# Patient Record
Sex: FEMALE | Race: WHITE | NOT HISPANIC OR LATINO | URBAN - METROPOLITAN AREA
[De-identification: names, ages, dates, MRNs, and addresses within clinical notes are randomized per-mention and may not be internally consistent; named-entity substitution may affect disease eponyms.]

---

## 2018-09-20 PROBLEM — Z00.00 ENCOUNTER FOR PREVENTIVE HEALTH EXAMINATION: Status: ACTIVE | Noted: 2018-09-20

## 2018-09-26 ENCOUNTER — OUTPATIENT (OUTPATIENT)
Dept: OUTPATIENT SERVICES | Facility: HOSPITAL | Age: 43
LOS: 1 days | End: 2018-09-26
Payer: COMMERCIAL

## 2018-09-26 ENCOUNTER — RESULT REVIEW (OUTPATIENT)
Age: 43
End: 2018-09-26

## 2018-09-26 ENCOUNTER — APPOINTMENT (OUTPATIENT)
Dept: ULTRASOUND IMAGING | Facility: HOSPITAL | Age: 43
End: 2018-09-26
Payer: COMMERCIAL

## 2018-09-26 PROCEDURE — 19083 BX BREAST 1ST LESION US IMAG: CPT | Mod: LT

## 2018-09-26 PROCEDURE — A4648: CPT

## 2018-09-26 PROCEDURE — 19083 BX BREAST 1ST LESION US IMAG: CPT

## 2018-09-26 PROCEDURE — 88305 TISSUE EXAM BY PATHOLOGIST: CPT

## 2018-09-26 PROCEDURE — 77065 DX MAMMO INCL CAD UNI: CPT | Mod: 26,LT

## 2018-09-26 PROCEDURE — 77065 DX MAMMO INCL CAD UNI: CPT

## 2018-09-27 LAB — SURGICAL PATHOLOGY STUDY: SIGNIFICANT CHANGE UP

## 2024-04-01 ENCOUNTER — NON-APPOINTMENT (OUTPATIENT)
Age: 49
End: 2024-04-01

## 2024-04-01 DIAGNOSIS — Z30.41 ENCOUNTER FOR SURVEILLANCE OF CONTRACEPTIVE PILLS: ICD-10-CM

## 2024-04-01 DIAGNOSIS — Z85.3 PERSONAL HISTORY OF MALIGNANT NEOPLASM OF BREAST: ICD-10-CM

## 2024-04-01 DIAGNOSIS — Z85.828 PERSONAL HISTORY OF OTHER MALIGNANT NEOPLASM OF SKIN: ICD-10-CM

## 2024-04-01 DIAGNOSIS — Z86.39 PERSONAL HISTORY OF OTHER ENDOCRINE, NUTRITIONAL AND METABOLIC DISEASE: ICD-10-CM

## 2024-04-01 DIAGNOSIS — Z78.9 OTHER SPECIFIED HEALTH STATUS: ICD-10-CM

## 2024-04-01 DIAGNOSIS — Z87.898 PERSONAL HISTORY OF OTHER SPECIFIED CONDITIONS: ICD-10-CM

## 2024-04-01 DIAGNOSIS — Z87.19 PERSONAL HISTORY OF OTHER DISEASES OF THE DIGESTIVE SYSTEM: ICD-10-CM

## 2024-04-01 DIAGNOSIS — Z80.0 FAMILY HISTORY OF MALIGNANT NEOPLASM OF DIGESTIVE ORGANS: ICD-10-CM

## 2024-04-01 DIAGNOSIS — Z80.7 FAMILY HISTORY OF OTHER MALIGNANT NEOPLASMS OF LYMPHOID, HEMATOPOIETIC AND RELATED TISSUES: ICD-10-CM

## 2024-04-01 DIAGNOSIS — C50.919 MALIGNANT NEOPLASM OF UNSPECIFIED SITE OF UNSPECIFIED FEMALE BREAST: ICD-10-CM

## 2024-04-01 DIAGNOSIS — R79.89 OTHER SPECIFIED ABNORMAL FINDINGS OF BLOOD CHEMISTRY: ICD-10-CM

## 2024-04-01 RX ORDER — OMEGA-3/DHA/EPA/FISH OIL 300-1000MG
CAPSULE ORAL
Refills: 0 | Status: ACTIVE | COMMUNITY

## 2024-04-01 RX ORDER — CHOLECALCIFEROL (VITAMIN D3) 25 MCG
TABLET ORAL
Refills: 0 | Status: ACTIVE | COMMUNITY

## 2024-04-01 RX ORDER — CALCIUM CARBONATE/VITAMIN D3 600 MG-10
TABLET ORAL
Refills: 0 | Status: ACTIVE | COMMUNITY

## 2024-04-01 RX ORDER — UBIDECARENONE 200 MG
CAPSULE ORAL
Refills: 0 | Status: ACTIVE | COMMUNITY

## 2024-04-01 RX ORDER — ELECTROLYTES/DEXTROSE
SOLUTION, ORAL ORAL
Refills: 0 | Status: ACTIVE | COMMUNITY

## 2024-04-01 RX ORDER — ASCORBIC ACID 500 MG
TABLET ORAL
Refills: 0 | Status: ACTIVE | COMMUNITY

## 2024-08-26 NOTE — HISTORY OF PRESENT ILLNESS
[de-identified] : At age 39, patient had palpable abnormality in her Lt breast.  Core biopsy consistent with fibroadenoma.  On 8/15/18, bilat breast MRI revealed 2 enhancing masses Lt suggestive of fibroadenoma and then additional 1.4 cm subareolar mass and  Rt breast 9 mm enhancing mass retroareolar 12 o'clock with  benign morphology consistent with fibroadenoma.  Targetted ultrasound of Lt revealed solid mass with increased vascularity subareolar Lt and 6 mm well-circumscribed, hypoechoic mass Rt 7 o'clock consistent with fibroadenoma.  On 18, ultrasound-guided FNA Lt consistent with papillary lesion and ultrasound-guided Lt 2 o'clock proliferative change without atypia.  Path review [facility] FNA Lt subareolar possible papillary neoplasm versus fibroadenoma, 2 o'clock fibroadenoma Lt and core Lt 2 o'clock fibroadenoma.  On 10/17/18, MRI-guided biopsy Lt LCIS.  10/25/18, WLE Lt 3 o'clock LCIS classic type with intermediate grade nuclei, intraductal papilloma 2 mm, UOQ microinvasive lobular carcinoma and LCIS, 2 foci microinvasion <1 mm identified with intermediate grade nuclei, no LVI, LCIS classic type with intermediate grade nuclei, sclerotic intraductal papilloma with ductal hyperplasia without atypia, additional areas showed LCIS and ADH, and margins were neg.  ER and NM and HER-2 could not be done since there were no tissue available.  She presents today for further evaluation.   ALLERGIES: Latex.  PMH: Menarche 14, has IUD, G2, P2, GYN summer , Mirena IUD.   29 and 31, breast-fed 6 M each, clomid 1 cycle, OCP in 20s, Mirena placed  and again , Hx hypothyroid, hernia repair, abdominal inguinal , fractured Lt wrist age 13.  Colonoscopy 5-8 Y ago, BMD 1 Y ago.  BRCA neg.  Hx squamous cell carcinoma back and labia, low testosterone.  FAMILY HX: Ashkenazic, mother 74 and had 2 brothers, father 78 and had 1 sister with colon cancer in his 50s, a paternal female first cousin had non-Hodgkin's lymphoma, patient comes from a family of a few women.Patient has 1 sister, 2 brothers, and 2 daughters.   SOCIAL HX: , , lives with her , heterosexual and active, exercises regularly, no tobacco, nonsmoker, occasional EtOH.

## 2024-08-26 NOTE — ASSESSMENT
[FreeTextEntry1] : [facility] S/P WLE for classic microinvasive lobular carcinoma - 2 foci <1 mm each and classic LCIS as well as intraductal papilloma.  Pathology and prognosis were reviewed in detail.  No tissue  available to perform ER/KS and HER-2, but suspect would be hormone receptor pos.  Rationale for RT considering her young age and microinvasion was discussed in detail as well as antiestrogen treatment with Tamoxifen.  Risk of disease recurrence, local versus systemic as well as contralateral primary were discussed.  Patient is at low risk for systemic recurrence in light of microinvasion.  Patient wishes to pursue bilat mastectomy with reconstruction since she does not want to pursue antiestrogen or RT.

## 2024-09-05 ENCOUNTER — APPOINTMENT (OUTPATIENT)
Dept: HEMATOLOGY ONCOLOGY | Facility: CLINIC | Age: 49
End: 2024-09-05
Payer: COMMERCIAL

## 2024-09-05 ENCOUNTER — NON-APPOINTMENT (OUTPATIENT)
Age: 49
End: 2024-09-05

## 2024-09-05 VITALS
HEART RATE: 88 BPM | DIASTOLIC BLOOD PRESSURE: 75 MMHG | BODY MASS INDEX: 22.66 KG/M2 | WEIGHT: 120 LBS | SYSTOLIC BLOOD PRESSURE: 109 MMHG | OXYGEN SATURATION: 97 % | TEMPERATURE: 98.7 F | HEIGHT: 61 IN | RESPIRATION RATE: 18 BRPM

## 2024-09-05 DIAGNOSIS — Z90.13 ACQUIRED ABSENCE OF BILATERAL BREASTS AND NIPPLES: ICD-10-CM

## 2024-09-05 DIAGNOSIS — C50.912 MALIGNANT NEOPLASM OF UNSPECIFIED SITE OF LEFT FEMALE BREAST: ICD-10-CM

## 2024-09-05 PROCEDURE — G2211 COMPLEX E/M VISIT ADD ON: CPT | Mod: NC

## 2024-09-05 PROCEDURE — 99204 OFFICE O/P NEW MOD 45 MIN: CPT

## 2024-09-05 RX ORDER — ZINC OXIDE 13 %
CREAM (GRAM) TOPICAL
Refills: 0 | Status: ACTIVE | COMMUNITY
Start: 2024-09-05

## 2024-09-06 PROBLEM — Z90.13 STATUS POST BILATERAL MASTECTOMY: Status: ACTIVE | Noted: 2024-09-05

## 2024-09-06 PROBLEM — C50.912 MALIGNANT NEOPLASM OF LEFT FEMALE BREAST, UNSPECIFIED ESTROGEN RECEPTOR STATUS, UNSPECIFIED SITE OF BREAST: Status: ACTIVE | Noted: 2024-09-06

## 2024-09-06 NOTE — HISTORY OF PRESENT ILLNESS
[de-identified] : At age 39, patient had palpable abnormality in her Lt breast.  Core biopsy consistent with fibroadenoma.  On 8/15/18, bilat breast MRI revealed 2 enhancing masses Lt suggestive of fibroadenoma and then additional 1.4 cm subareolar mass and  Rt breast 9 mm enhancing mass retroareolar 12 o'clock with  benign morphology consistent with fibroadenoma.  Targetted ultrasound of Lt revealed solid mass with increased vascularity subareolar Lt and 6 mm well-circumscribed, hypoechoic mass Rt 7 o'clock consistent with fibroadenoma.  On 18, ultrasound-guided FNA Lt consistent with papillary lesion and ultrasound-guided Lt 2 o'clock proliferative change without atypia.  Path review [facility] FNA Lt subareolar possible papillary neoplasm versus fibroadenoma, 2 o'clock fibroadenoma Lt and core Lt 2 o'clock fibroadenoma.  On 10/17/18, MRI-guided biopsy Lt LCIS.  10/25/18, WLE Lt 3 o'clock LCIS classic type with intermediate grade nuclei, intraductal papilloma 2 mm, UOQ microinvasive lobular carcinoma and LCIS, 2 foci microinvasion <1 mm identified with intermediate grade nuclei, no LVI, LCIS classic type with intermediate grade nuclei, sclerotic intraductal papilloma with ductal hyperplasia without atypia, additional areas showed LCIS and ADH, and margins were neg.  ER and MO and HER-2 could not be done since no tissue available. 2018 Bilat mastectomy sln - Rt benign, Lt LCIS and 1 Lt SLN neg. 10/3/23 Breast MRI - OBIE. HSS BMD with osteopenia wrist - D with K , wt bearing exercise. Trying to be vegan. C/o sensitivity L breast   PMH: Menarche 14, has IUD, G2, P2, GYN    - no menses p 2-3 months   29 and 31, breast-fed 6 M each, clomid 1 cycle, OCP in 20s, Mirena placed  and again  removed , Hx hypothyroid, hernia repair, abdominal inguinal , fractured Lt wrist age 13.  Colonoscopy    Hx squamous cell carcinoma back and labia, low testosterone.  FAMILY HX:Invitae  vus MARIAM- reclassified as likely benign. Ashkenazic, mother 74 and had 2 brothers, father 78 and had 1 sister with colon cancer in his 50s, a paternal female first cousin had non-Hodgkin's lymphoma, patient comes from a family of a few women.Patient has 1 sister, 2 brothers, and 2 daughters.   SOCIAL HX: , , lives with her , heterosexual and active, exercises regularly, no tobacco, nonsmoker, occasional EtOH.

## 2024-09-06 NOTE — HISTORY OF PRESENT ILLNESS
[de-identified] : At age 39, patient had palpable abnormality in her Lt breast.  Core biopsy consistent with fibroadenoma.  On 8/15/18, bilat breast MRI revealed 2 enhancing masses Lt suggestive of fibroadenoma and then additional 1.4 cm subareolar mass and  Rt breast 9 mm enhancing mass retroareolar 12 o'clock with  benign morphology consistent with fibroadenoma.  Targetted ultrasound of Lt revealed solid mass with increased vascularity subareolar Lt and 6 mm well-circumscribed, hypoechoic mass Rt 7 o'clock consistent with fibroadenoma.  On 18, ultrasound-guided FNA Lt consistent with papillary lesion and ultrasound-guided Lt 2 o'clock proliferative change without atypia.  Path review [facility] FNA Lt subareolar possible papillary neoplasm versus fibroadenoma, 2 o'clock fibroadenoma Lt and core Lt 2 o'clock fibroadenoma.  On 10/17/18, MRI-guided biopsy Lt LCIS.  10/25/18, WLE Lt 3 o'clock LCIS classic type with intermediate grade nuclei, intraductal papilloma 2 mm, UOQ microinvasive lobular carcinoma and LCIS, 2 foci microinvasion <1 mm identified with intermediate grade nuclei, no LVI, LCIS classic type with intermediate grade nuclei, sclerotic intraductal papilloma with ductal hyperplasia without atypia, additional areas showed LCIS and ADH, and margins were neg.  ER and OR and HER-2 could not be done since no tissue available. 2018 Bilat mastectomy sln - Rt benign, Lt LCIS and 1 Lt SLN neg. 10/3/23 Breast MRI - OBIE. HSS BMD with osteopenia wrist - D with K , wt bearing exercise. Trying to be vegan. C/o sensitivity L breast   PMH: Menarche 14, has IUD, G2, P2, GYN    - no menses p 2-3 months   29 and 31, breast-fed 6 M each, clomid 1 cycle, OCP in 20s, Mirena placed  and again  removed , Hx hypothyroid, hernia repair, abdominal inguinal , fractured Lt wrist age 13.  Colonoscopy    Hx squamous cell carcinoma back and labia, low testosterone.  FAMILY HX:Invitae  vus MARIAM- reclassified as likely benign. Ashkenazic, mother 74 and had 2 brothers, father 78 and had 1 sister with colon cancer in his 50s, a paternal female first cousin had non-Hodgkin's lymphoma, patient comes from a family of a few women.Patient has 1 sister, 2 brothers, and 2 daughters.   SOCIAL HX: , , lives with her , heterosexual and active, exercises regularly, no tobacco, nonsmoker, occasional EtOH.

## 2024-09-06 NOTE — PHYSICAL EXAM
[Fully active, able to carry on all pre-disease performance without restriction] : Status 0 - Fully active, able to carry on all pre-disease performance without restriction [Normal] : affect appropriate [de-identified] : S/P bilat mast with implants LNSP ? periareolar density

## 2024-09-06 NOTE — ASSESSMENT
[FreeTextEntry1] : Gene neg Ashkenazic, perimenopause,  S/P Lt WLE for classic microinvasive lobular carcinoma - 2 foci <1 mm each and classic LCIS as well as intraductal papilloma. [id number] Pathology and prognosis were reviewed in detail.  S/p Bilat mastectomy with implants -., L NSP. New c/o sensitivity near L nipple ? density - for diagnostic US. Reviewed diet and exercise. F/U 6 months

## 2024-09-06 NOTE — PHYSICAL EXAM
[Fully active, able to carry on all pre-disease performance without restriction] : Status 0 - Fully active, able to carry on all pre-disease performance without restriction [Normal] : affect appropriate [de-identified] : S/P bilat mast with implants LNSP ? periareolar density

## 2024-09-06 NOTE — PHYSICAL EXAM
[Fully active, able to carry on all pre-disease performance without restriction] : Status 0 - Fully active, able to carry on all pre-disease performance without restriction [Normal] : affect appropriate [de-identified] : S/P bilat mast with implants LNSP ? periareolar density

## 2024-09-06 NOTE — HISTORY OF PRESENT ILLNESS
[de-identified] : At age 39, patient had palpable abnormality in her Lt breast.  Core biopsy consistent with fibroadenoma.  On 8/15/18, bilat breast MRI revealed 2 enhancing masses Lt suggestive of fibroadenoma and then additional 1.4 cm subareolar mass and  Rt breast 9 mm enhancing mass retroareolar 12 o'clock with  benign morphology consistent with fibroadenoma.  Targetted ultrasound of Lt revealed solid mass with increased vascularity subareolar Lt and 6 mm well-circumscribed, hypoechoic mass Rt 7 o'clock consistent with fibroadenoma.  On 18, ultrasound-guided FNA Lt consistent with papillary lesion and ultrasound-guided Lt 2 o'clock proliferative change without atypia.  Path review [facility] FNA Lt subareolar possible papillary neoplasm versus fibroadenoma, 2 o'clock fibroadenoma Lt and core Lt 2 o'clock fibroadenoma.  On 10/17/18, MRI-guided biopsy Lt LCIS.  10/25/18, WLE Lt 3 o'clock LCIS classic type with intermediate grade nuclei, intraductal papilloma 2 mm, UOQ microinvasive lobular carcinoma and LCIS, 2 foci microinvasion <1 mm identified with intermediate grade nuclei, no LVI, LCIS classic type with intermediate grade nuclei, sclerotic intraductal papilloma with ductal hyperplasia without atypia, additional areas showed LCIS and ADH, and margins were neg.  ER and OR and HER-2 could not be done since no tissue available. 2018 Bilat mastectomy sln - Rt benign, Lt LCIS and 1 Lt SLN neg. 10/3/23 Breast MRI - OBIE. HSS BMD with osteopenia wrist - D with K , wt bearing exercise. Trying to be vegan. C/o sensitivity L breast   PMH: Menarche 14, has IUD, G2, P2, GYN    - no menses p 2-3 months   29 and 31, breast-fed 6 M each, clomid 1 cycle, OCP in 20s, Mirena placed  and again  removed , Hx hypothyroid, hernia repair, abdominal inguinal , fractured Lt wrist age 13.  Colonoscopy    Hx squamous cell carcinoma back and labia, low testosterone.  FAMILY HX:Invitae  vus MARIAM- reclassified as likely benign. Ashkenazic, mother 74 and had 2 brothers, father 78 and had 1 sister with colon cancer in his 50s, a paternal female first cousin had non-Hodgkin's lymphoma, patient comes from a family of a few women.Patient has 1 sister, 2 brothers, and 2 daughters.   SOCIAL HX: , , lives with her , heterosexual and active, exercises regularly, no tobacco, nonsmoker, occasional EtOH.

## 2024-11-20 DIAGNOSIS — R91.1 SOLITARY PULMONARY NODULE: ICD-10-CM

## 2025-03-06 ENCOUNTER — APPOINTMENT (OUTPATIENT)
Dept: HEMATOLOGY ONCOLOGY | Facility: CLINIC | Age: 50
End: 2025-03-06
Payer: COMMERCIAL

## 2025-03-06 VITALS
HEART RATE: 89 BPM | DIASTOLIC BLOOD PRESSURE: 81 MMHG | TEMPERATURE: 98.2 F | HEIGHT: 61 IN | RESPIRATION RATE: 18 BRPM | SYSTOLIC BLOOD PRESSURE: 119 MMHG | BODY MASS INDEX: 23.32 KG/M2 | WEIGHT: 123.5 LBS | OXYGEN SATURATION: 97 %

## 2025-03-06 DIAGNOSIS — C50.912 MALIGNANT NEOPLASM OF UNSPECIFIED SITE OF LEFT FEMALE BREAST: ICD-10-CM

## 2025-03-06 DIAGNOSIS — Z90.13 ACQUIRED ABSENCE OF BILATERAL BREASTS AND NIPPLES: ICD-10-CM

## 2025-03-06 DIAGNOSIS — R91.1 SOLITARY PULMONARY NODULE: ICD-10-CM

## 2025-03-06 DIAGNOSIS — N95.1 MENOPAUSAL AND FEMALE CLIMACTERIC STATES: ICD-10-CM

## 2025-03-06 PROCEDURE — G2211 COMPLEX E/M VISIT ADD ON: CPT | Mod: NC

## 2025-03-06 PROCEDURE — 99214 OFFICE O/P EST MOD 30 MIN: CPT

## 2025-03-07 PROBLEM — N95.1 MENOPAUSAL VAGINAL DRYNESS: Status: ACTIVE | Noted: 2025-03-07
